# Patient Record
Sex: MALE | Race: WHITE | URBAN - METROPOLITAN AREA
[De-identification: names, ages, dates, MRNs, and addresses within clinical notes are randomized per-mention and may not be internally consistent; named-entity substitution may affect disease eponyms.]

---

## 2023-03-13 ENCOUNTER — TELEMEDICINE (OUTPATIENT)
Dept: PRIMARY CARE | Facility: CLINIC | Age: 33
End: 2023-03-13
Payer: COMMERCIAL

## 2023-03-13 DIAGNOSIS — J01.91 ACUTE RECURRENT SINUSITIS, UNSPECIFIED LOCATION: Primary | ICD-10-CM

## 2023-03-13 PROCEDURE — 99212 OFFICE O/P EST SF 10 MIN: CPT | Performed by: NURSE PRACTITIONER

## 2023-03-13 RX ORDER — AMLODIPINE BESYLATE 5 MG/1
5 TABLET ORAL DAILY
COMMUNITY
Start: 2023-02-06 | End: 2023-11-03 | Stop reason: SDUPTHER

## 2023-03-13 RX ORDER — FLUTICASONE PROPIONATE 50 MCG
1 SPRAY, SUSPENSION (ML) NASAL DAILY
Qty: 16 G | Refills: 0 | Status: SHIPPED | OUTPATIENT
Start: 2023-03-13 | End: 2024-02-08 | Stop reason: WASHOUT

## 2023-03-13 RX ORDER — AMOXICILLIN AND CLAVULANATE POTASSIUM 875; 125 MG/1; MG/1
875 TABLET, FILM COATED ORAL 2 TIMES DAILY
Qty: 20 TABLET | Refills: 0 | Status: SHIPPED | OUTPATIENT
Start: 2023-03-13 | End: 2023-03-23

## 2023-03-13 ASSESSMENT — ENCOUNTER SYMPTOMS
DIARRHEA: 0
CONSTIPATION: 0
ABDOMINAL DISTENTION: 0
APPETITE CHANGE: 0
VOMITING: 0
ACTIVITY CHANGE: 0
SHORTNESS OF BREATH: 0
COUGH: 1
NUMBNESS: 0
PALPITATIONS: 0
DYSURIA: 0
EYE ITCHING: 0
FREQUENCY: 0
SINUS PAIN: 0
RHINORRHEA: 1
WOUND: 0
SINUS PRESSURE: 1
EYE PAIN: 0
NERVOUS/ANXIOUS: 0
WHEEZING: 0
ARTHRALGIAS: 0
HEMATURIA: 0
ABDOMINAL PAIN: 0
SORE THROAT: 0

## 2023-03-13 NOTE — PROGRESS NOTES
Subjective   Patient ID: Adithya Krishnamurthy is a 32 y.o. male who presents for Cough (Symptoms began 3/6. Negative COVID ).    HPI   32 year old male presents with 1 week history of nasal congestion, cough, sinus pressure, and rhinorrhea.   He denies fever, chills, nausea, vomiting, diarrhea, chest pain, or SOB.   He has not been using anything OTC       Review of Systems   Constitutional:  Negative for activity change and appetite change.   HENT:  Positive for postnasal drip, rhinorrhea and sinus pressure. Negative for congestion, ear discharge, ear pain, sinus pain and sore throat.    Eyes:  Negative for pain and itching.   Respiratory:  Positive for cough. Negative for shortness of breath and wheezing.    Cardiovascular:  Negative for chest pain, palpitations and leg swelling.   Gastrointestinal:  Negative for abdominal distention, abdominal pain, constipation, diarrhea and vomiting.   Genitourinary:  Negative for dysuria, frequency, hematuria and urgency.   Musculoskeletal:  Negative for arthralgias and gait problem.   Skin:  Negative for rash and wound.   Neurological:  Negative for numbness.   Psychiatric/Behavioral:  The patient is not nervous/anxious.        Objective   There were no vitals taken for this visit.  BSA There is no height or weight on file to calculate BSA.      Physical Exam  No visits with results within 1 Year(s) from this visit.   Latest known visit with results is:   Legacy Encounter on 12/15/2020   Component Date Value Ref Range Status    SARS-COV-2 RESULT 12/15/2020 DETECTED (A)  Not Detected Final    Comment: .  This assay is designed to detect the N, ORF1ab and/or S genes of SARS-CoV-2   via nucleic acid amplification.  A Negative (NOT DETECTED) result does not   preclude 2019-nCoV infection since the adequacy of sample collection and/or   low viral burden may result in presence of viral nucleic acids below the   clinical sensitivity of this test method.  Negative (NOT DETECTED) result    should not be used as the sole basis for treatment or other patient   management decisions. Rather negative results should be combined with   clinical observations, patient history, and epidemiological information to   make patient management decisions.    Fact sheet for providers: https://www.fda.gov/media/328136/download  Fact sheet for patients: https://www.fda.gov/media/599990/download    This test has received FDA Emergency Use Authorization (EUA) and has been   verified by [facility] ([facility]). This test   is only authorized for the duration of time that circumstances                            exist to   justify the authorization of the emergency use of in vitro diagnostic tests   for the detection of SARS-CoV-2 virus and/or diagnosis of COVID-19 infection   under section 564(b)(1) of the Act, 21 U.S.C. 360bbb-3(b)(1), unless the   authorization is terminated or revoked sooner.      [facility] is certified under CLIA-88 as   qualified to perform high complexity testing. Testing is performed in the   [facility] laboratories located at [address] [place] [address].   16:48 12/15/2020.  COVID CALLED TO KATHRINE, 12/15/2020 16:48       Current Outpatient Medications on File Prior to Visit   Medication Sig Dispense Refill    amLODIPine (Norvasc) 5 mg tablet Take 1 tablet (5 mg) by mouth once daily.       No current facility-administered medications on file prior to visit.     No images are attached to the encounter.            Assessment/Plan   Problem List Items Addressed This Visit          Infectious/Inflammatory     Antibiotic sent to pharmacy  Advised patient to push fluids and start use of cool mist humidifier  Patient to start using flonase nasal spray  Patient to call if develop new or worsening symptoms           Acute recurrent sinusitis - Primary    Relevant Medications    fluticasone (Flonase) 50 mcg/actuation nasal spray     amoxicillin-pot clavulanate (Augmentin) 875-125 mg tablet

## 2023-03-13 NOTE — ASSESSMENT & PLAN NOTE
Antibiotic sent to pharmacy  Advised patient to push fluids and start use of cool mist humidifier  Patient to start using flonase nasal spray  Patient to call if develop new or worsening symptoms

## 2023-03-13 NOTE — PATIENT INSTRUCTIONS
Start Flonase daily for the next 1 week  If no improvement in 3-4 days then may start Augmentin  Call if worsening symptoms develop

## 2023-11-03 DIAGNOSIS — I10 ESSENTIAL HYPERTENSION: Primary | ICD-10-CM

## 2023-11-03 RX ORDER — AMLODIPINE BESYLATE 5 MG/1
5 TABLET ORAL DAILY
Qty: 30 TABLET | Refills: 0 | Status: SHIPPED | OUTPATIENT
Start: 2023-11-03 | End: 2023-12-04 | Stop reason: SDUPTHER

## 2023-12-04 DIAGNOSIS — I10 ESSENTIAL HYPERTENSION: ICD-10-CM

## 2023-12-05 RX ORDER — AMLODIPINE BESYLATE 5 MG/1
5 TABLET ORAL DAILY
Qty: 30 TABLET | Refills: 0 | Status: SHIPPED | OUTPATIENT
Start: 2023-12-05 | End: 2024-01-08

## 2024-01-08 DIAGNOSIS — I10 ESSENTIAL HYPERTENSION: ICD-10-CM

## 2024-01-08 RX ORDER — AMLODIPINE BESYLATE 5 MG/1
5 TABLET ORAL DAILY
Qty: 30 TABLET | Refills: 0 | Status: SHIPPED | OUTPATIENT
Start: 2024-01-08 | End: 2024-02-08 | Stop reason: SDUPTHER

## 2024-01-15 PROBLEM — R07.9 CHEST PAIN: Status: ACTIVE | Noted: 2024-01-15

## 2024-01-15 PROBLEM — F17.200 NICOTINE USE DISORDER: Status: ACTIVE | Noted: 2018-10-10

## 2024-01-15 PROBLEM — I10 ESSENTIAL HYPERTENSION: Chronic | Status: ACTIVE | Noted: 2024-01-15

## 2024-01-15 PROBLEM — F41.8 DEPRESSION WITH ANXIETY: Status: ACTIVE | Noted: 2024-01-15

## 2024-01-15 PROBLEM — F41.9 ANXIETY: Status: ACTIVE | Noted: 2024-01-15

## 2024-01-15 PROBLEM — R94.31 ACUTE ELECTROCARDIOGRAM CHANGES: Status: ACTIVE | Noted: 2024-01-15

## 2024-01-15 PROBLEM — U07.1 COVID-19: Status: ACTIVE | Noted: 2024-01-15

## 2024-01-22 ENCOUNTER — APPOINTMENT (OUTPATIENT)
Dept: CARDIOLOGY | Facility: CLINIC | Age: 34
End: 2024-01-22
Payer: COMMERCIAL

## 2024-02-08 ENCOUNTER — OFFICE VISIT (OUTPATIENT)
Dept: CARDIOLOGY | Facility: CLINIC | Age: 34
End: 2024-02-08
Payer: COMMERCIAL

## 2024-02-08 VITALS
WEIGHT: 205 LBS | HEART RATE: 76 BPM | DIASTOLIC BLOOD PRESSURE: 100 MMHG | OXYGEN SATURATION: 98 % | SYSTOLIC BLOOD PRESSURE: 153 MMHG | BODY MASS INDEX: 29.35 KG/M2 | HEIGHT: 70 IN

## 2024-02-08 DIAGNOSIS — I10 ESSENTIAL HYPERTENSION: Primary | ICD-10-CM

## 2024-02-08 DIAGNOSIS — Z72.0 NICOTINE VAPOR PRODUCT USER: ICD-10-CM

## 2024-02-08 DIAGNOSIS — F10.20 MODERATE ALCOHOL USE DISORDER (MULTI): ICD-10-CM

## 2024-02-08 DIAGNOSIS — E78.5 DYSLIPIDEMIA, GOAL LDL BELOW 100: ICD-10-CM

## 2024-02-08 PROBLEM — R94.31 ACUTE ELECTROCARDIOGRAM CHANGES: Status: RESOLVED | Noted: 2024-01-15 | Resolved: 2024-02-08

## 2024-02-08 PROBLEM — J01.91 ACUTE RECURRENT SINUSITIS: Status: RESOLVED | Noted: 2023-03-13 | Resolved: 2024-02-08

## 2024-02-08 PROCEDURE — 99213 OFFICE O/P EST LOW 20 MIN: CPT | Performed by: STUDENT IN AN ORGANIZED HEALTH CARE EDUCATION/TRAINING PROGRAM

## 2024-02-08 PROCEDURE — 3080F DIAST BP >= 90 MM HG: CPT | Performed by: STUDENT IN AN ORGANIZED HEALTH CARE EDUCATION/TRAINING PROGRAM

## 2024-02-08 PROCEDURE — 99213 OFFICE O/P EST LOW 20 MIN: CPT | Mod: 25 | Performed by: STUDENT IN AN ORGANIZED HEALTH CARE EDUCATION/TRAINING PROGRAM

## 2024-02-08 PROCEDURE — 3077F SYST BP >= 140 MM HG: CPT | Performed by: STUDENT IN AN ORGANIZED HEALTH CARE EDUCATION/TRAINING PROGRAM

## 2024-02-08 RX ORDER — AMLODIPINE BESYLATE 5 MG/1
5 TABLET ORAL DAILY
Qty: 30 TABLET | Refills: 3 | Status: SHIPPED | OUTPATIENT
Start: 2024-02-08 | End: 2024-02-27 | Stop reason: SDUPTHER

## 2024-02-08 ASSESSMENT — ENCOUNTER SYMPTOMS
NEUROLOGICAL NEGATIVE: 1
GASTROINTESTINAL NEGATIVE: 1
PND: 0
DYSPNEA ON EXERTION: 0
RESPIRATORY NEGATIVE: 1
CONSTITUTIONAL NEGATIVE: 1
HEMATOLOGIC/LYMPHATIC NEGATIVE: 1
MUSCULOSKELETAL NEGATIVE: 1
NEAR-SYNCOPE: 0
PALPITATIONS: 0
SYNCOPE: 0
ENDOCRINE NEGATIVE: 1
EYES NEGATIVE: 1
PSYCHIATRIC NEGATIVE: 1
ORTHOPNEA: 0
ALLERGIC/IMMUNOLOGIC NEGATIVE: 1

## 2024-02-08 NOTE — PATIENT INSTRUCTIONS
For your high blood pressure we will continue amlodipine 5 mg daily; if your home blood pressure is running high (greater than 130/90) we would then increase to amlodipine 10 mg daily.     Great job on your weight loss; keep up the good work!    We recommend dialing back your alcohol intake to no more than 2 alcoholic beverages in 1 day (no more than 14 in one week).     We will see you back in heart clinic in 1 year or earlier if needed. Please call my nurse Allison with any questions.     Thank you for your visit today. Please contact our office (via [employer] or phone) with any additional questions.     [facility] [place]    Allison, RN/Clinic Nurse for:    Dr. Monisha Soto    [address], [address]  [place] [address]    Phone: [phone] Press Option 5 then Option 3 to speak with the Clinic Nurse (Allison)    _____    To Reach:    Billing Questions -    [phone]  Scheduling / Rescheduling -  Option 1  Refills / Medication Requests -  Option 3  General Office /  -  Option 4  Results -     Option 6  Medical Records -    Option 7  Repeat Options -    Option 9

## 2024-02-08 NOTE — PROGRESS NOTES
[facility] Cardiology Outpatient Follow-up Visit     Reason for Visit: outpatient follow-up visit; HTN    HPI: Adithya Krishnamurthy is a 33 y.o.  male who presents today for a follow-up visit. Past medical history of hypertension, SARS 2 COVID, anxiety.     Patient was previously evaluated in the ED after a near syncopal episode. EKG showed sinus rhythm with possible possible inferior Q waves in 2 3 and aVF. Etiology of lightheadedness was thought to be secondary to dehydration.     Patient was seen by his primary care physician and referred to cardiology clinic for further evaluation and treatment. Adithya presented to cardiology clinic on 11/10/2022. Prior episode of near syncope occurred when he was working. Patient thinks he may have been dehydrated. However he also had atypical chest pain that was unrelated to exertion. No dyspnea. No further episodes of near syncope or syncope. Recommended exercise stress echo for further evaluation.     Exercise stress echo (12/2022)- low risk for ischemia; no echocardiographic or ECG evidence of ischemia; adequate level of stress achieved    Adithya returned to cardiology clinic on 2/8/2024.  Patient denies any active cardiovascular complaints.  No recent chest pains.  Tolerating physical activity without active cardiac issues.  Per patient his home blood pressure has been running 120-130/80-90 mmHg on amlodipine 5 mg daily.   Patient is a nicotine vape user, not ready to quit.  Patient also drinks heavily with 6-8 beers daily; thinking about cutting back. Per patient he has lost ~ 35 lbs weight diet and lifestyle changes; encouraged patient's progress.       Past Medical History:   - As above    Surgical History:   He has no past surgical history on file.    Family History:   - no family history of early onset coronary disease     Allergies:  Patient has no known allergies.     Social History:   - former tobacco smoker; current nicotine vape user; occasional use; no  "illicit drug use;   - employed- works in [employer]     Prior Cardiovascular Testing (Personally Reviewed):     Exercise stress echo (12/2022)- low risk for ischemia; no echocardiographic or ECG evidence of ischemia; adequate level of stress achieved    Lipid panel (May 2020)- total cholesterol 281, HDL 59, , VLDL 36, triglycerides 182 mg/deciliter.     Exercise stress echo (2019)  1. Exercise stress echo was negative for ischemia at 95% of age maximal heart rate (11.6 METS).  2. There is no left ventricular hypertrophy; left ventricular ejection fraction was estimated at 60%; peak left ventricular ejection fraction at peak exercise was 70%.      Review of Systems:  Review of Systems   Constitutional: Negative.   HENT: Negative.     Eyes: Negative.    Cardiovascular:  Negative for chest pain, dyspnea on exertion, near-syncope, orthopnea, palpitations, paroxysmal nocturnal dyspnea and syncope.   Respiratory: Negative.     Endocrine: Negative.    Hematologic/Lymphatic: Negative.    Skin: Negative.    Musculoskeletal: Negative.    Gastrointestinal: Negative.    Genitourinary: Negative.    Neurological: Negative.    Psychiatric/Behavioral: Negative.     Allergic/Immunologic: Negative.        Outpatient Medications:    Current Outpatient Medications:     amLODIPine (Norvasc) 5 mg tablet, Take 1 tablet (5 mg) by mouth once daily. Needs an apt for additional refills, Disp: 30 tablet, Rfl: 0     Last Recorded Vitals  BP (!) 153/100 (BP Location: Left arm, Patient Position: Sitting, BP Cuff Size: Adult)   Pulse 76   Ht 1.778 m (5' 10\")   Wt 93 kg (205 lb)   SpO2 98%   BMI 29.41 kg/m²     Physical Exam:    Physical Exam  Constitutional:       General: He is not in acute distress.  HENT:      Head: Normocephalic.      Mouth/Throat:      Mouth: Mucous membranes are moist.   Eyes:      Extraocular Movements: Extraocular movements intact.      Conjunctiva/sclera: Conjunctivae normal.   Neck:      Vascular: No JVD. " "  Cardiovascular:      Rate and Rhythm: Normal rate and regular rhythm.      Heart sounds: No murmur heard.  Pulmonary:      Effort: Pulmonary effort is normal. No respiratory distress.      Breath sounds: Normal breath sounds.   Abdominal:      General: There is no distension.   Musculoskeletal:         General: No swelling.   Skin:     General: Skin is warm and dry.   Neurological:      General: No focal deficit present.      Mental Status: He is alert.      Cranial Nerves: No cranial nerve deficit.      Motor: No weakness.   Psychiatric:         Mood and Affect: Mood normal.         Behavior: Behavior normal.         Lab/Radiology/Diagnostic Review:    Labs    Lab Results   Component Value Date    GLUCOSE 92 05/15/2020    CALCIUM 9.8 05/15/2020     05/15/2020    K 4.3 05/15/2020    CO2 26 05/15/2020     05/15/2020    BUN 18 05/15/2020    CREATININE 0.97 05/15/2020       Lab Results   Component Value Date    WBC 5.8 05/15/2020    HGB 16.4 05/15/2020    HCT 48.4 05/15/2020    MCV 89 05/15/2020     05/15/2020       Lab Results   Component Value Date    CHOL 281 (H) 05/15/2020     Lab Results   Component Value Date    HDL 59.0 05/15/2020     No results found for: \"LDLCALC\"  Lab Results   Component Value Date    TRIG 182 (H) 05/15/2020       Lab Results   Component Value Date    TSH 2.23 05/15/2020       Assessment:    33 y.o.  male who presents today for a follow-up visit. Past medical history of hypertension, SARS 2 COVID, anxiety.     Adithya returned to cardiology clinic on 2/8/2024.  Patient denies any active cardiovascular complaints.  Per patient his home blood pressure has been running 120-130/80-90 mmHg on amlodipine 5 mg daily.  Encourage patient to keep home blood pressure log and share at his follow-up visit.  If hypertension remains suboptimally controlled would then increase amlodipine to 10 mg daily.      Patient is a nicotine vape user, not ready to quit.  Patient also drinks " heavily with 6-8 beers daily; thinking about cutting back.  Advised patient to cut back on his alcohol intake to no more than 2 alcoholic drinks daily with goal of no more than 14 alcoholic drinks in 1 week; patient verbalized understanding.    Given history of dyslipidemia we will recheck a fasting lipid panel prior to follow-up visit.    Overall Plan:  1.  Primary hypertension (goal blood pressure less than 130/90 mmHg)  - Continue amlodipine 5 mg daily; patient encouraged to keep home blood pressure log and if not at goal to contact our office.  - If blood pressure not at goal would then increase amlodipine to 10 mg daily; if additional antihypertensive therapy needed would then consider losartan  - Encouraged patient's weight loss along with dietary lifestyle modifications; encourage patient to cut back on his alcohol intake as heavy alcohol use can elevate blood pressure.    2.  Dyslipidemia (goal LDL less than 100 mg/dL)  - Repeat fasting lipid panel  - Continue dietary and lifestyle modifications  - If not at goal would then initiate therapy with atorvastatin 20 mg daily    3.  Moderate alcohol use  - Patient denies any history of alcohol withdrawal  - Patient currently drinking 6-8 beers daily; thinking about cutting back  - I personally counseled the patient for 5 minutes on cutting back on his alcohol intake; discussed goal of no more than 2 alcoholic drinks in a day and no more than 14 in a week    4.  Nicotine vape use  - Not ready to quit; will reassess at follow-up visit    5.  Encouraged regular physical activity; congratulated patient on his weight loss    Disposition-return to cardiology clinic in 1 year or earlier if needed    Thank you for your visit today. Please contact our office with any questions.     Matt Bearden MD

## 2024-02-27 ENCOUNTER — OFFICE VISIT (OUTPATIENT)
Dept: CARDIOLOGY | Facility: CLINIC | Age: 34
End: 2024-02-27
Payer: COMMERCIAL

## 2024-02-27 VITALS
HEIGHT: 70 IN | DIASTOLIC BLOOD PRESSURE: 90 MMHG | BODY MASS INDEX: 29.63 KG/M2 | SYSTOLIC BLOOD PRESSURE: 142 MMHG | OXYGEN SATURATION: 97 % | HEART RATE: 80 BPM | WEIGHT: 207 LBS

## 2024-02-27 DIAGNOSIS — F10.20 MODERATE ALCOHOL USE DISORDER (MULTI): ICD-10-CM

## 2024-02-27 DIAGNOSIS — I10 ESSENTIAL HYPERTENSION: Primary | Chronic | ICD-10-CM

## 2024-02-27 DIAGNOSIS — Z87.891 HISTORY OF NICOTINE VAPING: ICD-10-CM

## 2024-02-27 PROCEDURE — 99213 OFFICE O/P EST LOW 20 MIN: CPT | Performed by: STUDENT IN AN ORGANIZED HEALTH CARE EDUCATION/TRAINING PROGRAM

## 2024-02-27 PROCEDURE — 3080F DIAST BP >= 90 MM HG: CPT | Performed by: STUDENT IN AN ORGANIZED HEALTH CARE EDUCATION/TRAINING PROGRAM

## 2024-02-27 PROCEDURE — 3077F SYST BP >= 140 MM HG: CPT | Performed by: STUDENT IN AN ORGANIZED HEALTH CARE EDUCATION/TRAINING PROGRAM

## 2024-02-27 RX ORDER — AMLODIPINE BESYLATE 10 MG/1
10 TABLET ORAL DAILY
Qty: 90 TABLET | Refills: 3 | Status: SHIPPED | OUTPATIENT
Start: 2024-02-27 | End: 2025-02-26

## 2024-02-27 ASSESSMENT — ENCOUNTER SYMPTOMS
NEUROLOGICAL NEGATIVE: 1
MUSCULOSKELETAL NEGATIVE: 1
PALPITATIONS: 0
NEAR-SYNCOPE: 0
ORTHOPNEA: 0
RESPIRATORY NEGATIVE: 1
DYSPNEA ON EXERTION: 0
SYNCOPE: 0
PND: 0
HEMATOLOGIC/LYMPHATIC NEGATIVE: 1
ENDOCRINE NEGATIVE: 1
EYES NEGATIVE: 1
GASTROINTESTINAL NEGATIVE: 1
PSYCHIATRIC NEGATIVE: 1
ALLERGIC/IMMUNOLOGIC NEGATIVE: 1
CONSTITUTIONAL NEGATIVE: 1

## 2024-02-27 NOTE — PROGRESS NOTES
[facility] Cardiology Outpatient Follow-up Visit     Reason for Visit: outpatient follow-up visit; HTN    HPI: Adithya Krishnamurthy is a 33 y.o.  male who presents today for a follow-up visit. Past medical history of hypertension, SARS 2 COVID, hx nicotine vape use, moderate alcohol use, and anxiety.     Patient was previously evaluated in the ED after a near syncopal episode. EKG showed sinus rhythm with possible possible inferior Q waves in 2 3 and aVF. Etiology of lightheadedness was thought to be secondary to dehydration.     Patient was seen by his primary care physician and referred to cardiology clinic for further evaluation and treatment. Adithya presented to cardiology clinic on 11/10/2022. Prior episode of near syncope occurred when he was working. Patient thinks he may have been dehydrated. However he also had atypical chest pain that was unrelated to exertion. No dyspnea. No further episodes of near syncope or syncope. Recommended exercise stress echo for further evaluation.     Exercise stress echo (12/2022)- low risk for ischemia; no echocardiographic or ECG evidence of ischemia; adequate level of stress achieved    Adithya returned to cardiology clinic on 2/8/2024.  Patient denies any active cardiovascular complaints.  No recent chest pains.  Tolerating physical activity without active cardiac issues.  Per patient his home blood pressure has been running 120-130/80-90 mmHg on amlodipine 5 mg daily.   Patient is a nicotine vape user, not ready to quit.  Patient also drinks heavily with 6-8 beers daily; thinking about cutting back. Per patient he has lost ~ 35 lbs weight diet and lifestyle changes; encouraged patient's progress.     Adithya presented to cardiology clinic on 2/27/2024.  Patient noted that over the past 2 weeks his blood pressure was running 160-170/80-90 mmHg.  Patient stopped nicotine vaping given his high blood pressure.  And increased his amlodipine to 10 mg daily.  Patient notes  interval improvement in his hypertension management after above changes.  Patient presented today for follow-up visit.  He is currently asymptomatic from a cardiac standpoint.  No chest pain, dyspnea, orthopnea, PND, syncope, presyncope, palpitations, or pedal edema.  He remains nicotine free.  He is also cut back to 6 beers daily with plans to decrease his alcohol intake further.    Past Medical History:   - As above    Surgical History:   He has no past surgical history on file.    Family History:   - no family history of early onset coronary disease     Allergies:  Patient has no known allergies.     Social History:   - former tobacco smoker; current nicotine vape user; occasional use; no illicit drug use;   - employed- works in [employer]     Prior Cardiovascular Testing (Personally Reviewed):     Exercise stress echo (12/2022)- low risk for ischemia; no echocardiographic or ECG evidence of ischemia; adequate level of stress achieved    Lipid panel (May 2020)- total cholesterol 281, HDL 59, , VLDL 36, triglycerides 182 mg/deciliter.     Exercise stress echo (2019)  1. Exercise stress echo was negative for ischemia at 95% of age maximal heart rate (11.6 METS).  2. There is no left ventricular hypertrophy; left ventricular ejection fraction was estimated at 60%; peak left ventricular ejection fraction at peak exercise was 70%.      Review of Systems:  Review of Systems   Constitutional: Negative.   HENT: Negative.     Eyes: Negative.    Cardiovascular:  Negative for chest pain, dyspnea on exertion, near-syncope, orthopnea, palpitations, paroxysmal nocturnal dyspnea and syncope.   Respiratory: Negative.     Endocrine: Negative.    Hematologic/Lymphatic: Negative.    Skin: Negative.    Musculoskeletal: Negative.    Gastrointestinal: Negative.    Genitourinary: Negative.    Neurological: Negative.    Psychiatric/Behavioral: Negative.     Allergic/Immunologic: Negative.        Outpatient Medications:    Current  "Outpatient Medications:     amLODIPine (Norvasc) 5 mg tablet, Take 1 tablet (5 mg) by mouth once daily. Needs an apt for additional refills, Disp: 30 tablet, Rfl: 3     Last Recorded Vitals  /90 (BP Location: Left arm, Patient Position: Sitting, BP Cuff Size: Adult)   Pulse 80   Ht 1.778 m (5' 10\")   Wt 93.9 kg (207 lb)   SpO2 97%   BMI 29.70 kg/m²     Physical Exam:    Physical Exam  Constitutional:       General: He is not in acute distress.  HENT:      Head: Normocephalic.      Mouth/Throat:      Mouth: Mucous membranes are moist.   Eyes:      Extraocular Movements: Extraocular movements intact.      Conjunctiva/sclera: Conjunctivae normal.   Neck:      Vascular: No JVD.   Cardiovascular:      Rate and Rhythm: Normal rate and regular rhythm.      Heart sounds: No murmur heard.  Pulmonary:      Effort: Pulmonary effort is normal. No respiratory distress.      Breath sounds: Normal breath sounds.   Abdominal:      General: There is no distension.   Musculoskeletal:         General: No swelling.   Skin:     General: Skin is warm and dry.   Neurological:      General: No focal deficit present.      Mental Status: He is alert.      Cranial Nerves: No cranial nerve deficit.      Motor: No weakness.   Psychiatric:         Mood and Affect: Mood normal.         Behavior: Behavior normal.         Lab/Radiology/Diagnostic Review:    Labs    Lab Results   Component Value Date    GLUCOSE 92 05/15/2020    CALCIUM 9.8 05/15/2020     05/15/2020    K 4.3 05/15/2020    CO2 26 05/15/2020     05/15/2020    BUN 18 05/15/2020    CREATININE 0.97 05/15/2020       Lab Results   Component Value Date    WBC 5.8 05/15/2020    HGB 16.4 05/15/2020    HCT 48.4 05/15/2020    MCV 89 05/15/2020     05/15/2020       Lab Results   Component Value Date    CHOL 281 (H) 05/15/2020     Lab Results   Component Value Date    HDL 59.0 05/15/2020     No results found for: \"LDLCALC\"  Lab Results   Component Value Date    TRIG " 182 (H) 05/15/2020       Lab Results   Component Value Date    TSH 2.23 05/15/2020       Assessment:   33 y.o.  male who presents today for a follow-up visit. Past medical history of hypertension, SARS 2 COVID, hx nicotine vape use, moderate alcohol use, and anxiety.     Adithya presented to cardiology clinic on 2/27/2024.  Patient noted that over the past 2 weeks his blood pressure was running 160-170/80-90 mmHg.  Patient stopped nicotine vaping given his high blood pressure.  And increased his amlodipine to 10 mg daily.  Patient notes interval improvement in his hypertension management after above changes.  Patient presented today for follow-up visit.  He is currently asymptomatic from a cardiac standpoint.  No chest pain, dyspnea, orthopnea, PND, syncope, presyncope, palpitations, or pedal edema.  He remains nicotine free.  He is also cut back to 6 beers daily with plans to decrease his alcohol intake further.    Overall Plan:  1.  Primary hypertension (goal blood pressure less than 130/90 mmHg)  - Increase amlodipine 10 mg daily; patient encouraged to keep home blood pressure log and if not at goal to contact our office.  - If blood pressure not at goal would then add losartan 25 mg daily and up-titrate as tolerated  - Encouraged patient's weight loss along with dietary lifestyle modifications; encourage patient to cut back on his alcohol intake as heavy alcohol use can elevate blood pressure.    2.  Dyslipidemia (goal LDL less than 100 mg/dL)  - Repeat fasting lipid panel prior to follow-up visit in 6 months  - Continue dietary and lifestyle modifications  - If not at goal would then initiate therapy with atorvastatin 20 mg daily    3.  Moderate alcohol use  - Patient denies any history of alcohol withdrawal  - Patient currently drinking 6 beers daily; thinking about cutting back  - I personally counseled the patient for 3 minutes on cutting back on his alcohol intake; discussed goal of no more than 2  alcoholic drinks in a day and no more than 14 in a week    4.  Former Nicotine vape use  - Quit in 2/2024; motivated to remain nicotine free; congratulated patient on his progress    5.  Encouraged regular physical activity; congratulated patient on his weight loss    Disposition-return to cardiology clinic in 6 months or earlier if needed    Thank you for your visit today. Please contact our office with any questions.     Matt Bearden MD

## 2024-02-27 NOTE — PATIENT INSTRUCTIONS
For your high blood pressure; you have had interval improvement after quitting vaping.     We will increase your amlodipine to 10 mg daily. If your blood pressure was not at goal (< 140/90 mmHg) we would then consider adding losartan 25 mg daily.     If you have any questions, please call my nurse Allison.       Thank you for your visit today. Please contact our office (via [employer] or phone) with any additional questions.     [facility] [place]    LENORE Cooper/Clinic Nurse for:    Dr. Monisha Soto    [address], [address]  [place] [address]    Phone: [phone] Press Option 5 then Option 3 to speak with the Clinic Nurse (Allison)    _____    To Reach:    Billing Questions -    [phone]  Scheduling / Rescheduling -  Option 1  Refills / Medication Requests -  Option 3  General Office / [place] -  Option 4  Results -     Option 6  Medical Records -    Option 7  Repeat Options -    Option 9

## 2024-03-11 ENCOUNTER — TELEPHONE (OUTPATIENT)
Dept: CARDIOLOGY | Facility: CLINIC | Age: 34
End: 2024-03-11
Payer: COMMERCIAL

## 2024-03-11 DIAGNOSIS — I10 ESSENTIAL HYPERTENSION: ICD-10-CM

## 2024-03-11 RX ORDER — LOSARTAN POTASSIUM 25 MG/1
25 TABLET ORAL DAILY
Qty: 30 TABLET | Refills: 11 | Status: SHIPPED | OUTPATIENT
Start: 2024-03-11 | End: 2024-03-28 | Stop reason: SDUPTHER

## 2024-03-11 NOTE — TELEPHONE ENCOUNTER
"Per recent OV on 2/27: \"1.  Primary hypertension (goal blood pressure less than 130/90 mmHg)  - Increase amlodipine 10 mg daily; patient encouraged to keep home blood pressure log and if not at goal to contact our office.  - If blood pressure not at goal would then add losartan 25 mg daily and up-titrate as tolerated\"  Losartan 25mg erx sent to Provider. [employer] message sent to patient re: new med.    "

## 2024-03-11 NOTE — TELEPHONE ENCOUNTER
----- Message from Adithya Krishnamurthy sent at 3/11/2024 12:16 PM EDT -----  Regarding: Blood Pressure  Contact: [phone]  Hello-    Yes, I’m still taking the 10 and I take it after taking my meds.

## 2024-03-28 ENCOUNTER — TELEPHONE (OUTPATIENT)
Dept: CARDIOLOGY | Facility: CLINIC | Age: 34
End: 2024-03-28
Payer: COMMERCIAL

## 2024-03-28 DIAGNOSIS — I10 ESSENTIAL HYPERTENSION: ICD-10-CM

## 2024-03-28 RX ORDER — LOSARTAN POTASSIUM 25 MG/1
50 TABLET ORAL DAILY
Qty: 180 TABLET | Refills: 3 | Status: SHIPPED | OUTPATIENT
Start: 2024-03-28 | End: 2025-03-28

## 2024-03-28 NOTE — TELEPHONE ENCOUNTER
Spoke with Dr. Bearden and VO obtained to increase Losartan to 50mg daily.  Spoke with patient and instructions provided. Patient verb understanding. Will call in 2 weeks if bp remains elevated.

## 2024-03-28 NOTE — TELEPHONE ENCOUNTER
----- Message from Adithya Krishnamurthy sent at 3/27/2024  4:48 PM EDT -----  Regarding: Blood pressure  Contact: [phone]  Hello-    My BP is still consistently about 160/105 to 110, even with the new medication. This has me concerned. Boubacar if the new medication needs adjusted or if theres more testing that needs to be done to see why it’s not going down. Please let me know what the next step is.     ThanksSreekanth

## 2024-06-21 ENCOUNTER — TELEPHONE (OUTPATIENT)
Dept: PRIMARY CARE | Facility: CLINIC | Age: 34
End: 2024-06-21
Payer: COMMERCIAL

## 2024-06-21 DIAGNOSIS — L23.7 POISON IVY: ICD-10-CM

## 2024-06-21 RX ORDER — PREDNISONE 5 MG/1
TABLET ORAL DAILY
Qty: 55 TABLET | Refills: 0 | Status: SHIPPED | OUTPATIENT
Start: 2024-06-21 | End: 2024-07-01

## 2024-06-21 RX ORDER — MOMETASONE FUROATE 1 MG/G
CREAM TOPICAL
Qty: 45 G | Refills: 0 | Status: SHIPPED | OUTPATIENT
Start: 2024-06-21

## 2024-06-21 NOTE — TELEPHONE ENCOUNTER
Pt wife called the office stating her  put the wrong answer down regarding the medication for his poison Ivy. I was suppose to say he needs pills not cream. If you can change the prescription to the pill please advise    Pt number: [phone]

## 2024-06-24 ENCOUNTER — PATIENT MESSAGE (OUTPATIENT)
Dept: CARDIOLOGY | Facility: CLINIC | Age: 34
End: 2024-06-24
Payer: COMMERCIAL

## 2024-06-26 DIAGNOSIS — I10 ESSENTIAL HYPERTENSION: Chronic | ICD-10-CM

## 2024-06-26 DIAGNOSIS — I10 ESSENTIAL HYPERTENSION: ICD-10-CM

## 2024-06-26 RX ORDER — AMLODIPINE BESYLATE 10 MG/1
10 TABLET ORAL DAILY
Qty: 90 TABLET | Refills: 3 | Status: SHIPPED | OUTPATIENT
Start: 2024-06-26 | End: 2025-06-26

## 2024-06-26 RX ORDER — LOSARTAN POTASSIUM 100 MG/1
100 TABLET ORAL DAILY
Qty: 90 TABLET | Refills: 3 | Status: SHIPPED | OUTPATIENT
Start: 2024-06-26 | End: 2025-06-26

## 2024-11-26 ENCOUNTER — PATIENT MESSAGE (OUTPATIENT)
Dept: CARDIOLOGY | Facility: CLINIC | Age: 34
End: 2024-11-26
Payer: COMMERCIAL

## 2024-11-26 DIAGNOSIS — E78.5 DYSLIPIDEMIA, GOAL LDL BELOW 100: ICD-10-CM

## 2024-11-26 DIAGNOSIS — I10 ESSENTIAL HYPERTENSION: ICD-10-CM

## 2024-12-30 ENCOUNTER — OFFICE VISIT (OUTPATIENT)
Dept: CARDIOLOGY | Facility: CLINIC | Age: 34
End: 2024-12-30
Payer: COMMERCIAL

## 2024-12-30 VITALS
BODY MASS INDEX: 31.28 KG/M2 | SYSTOLIC BLOOD PRESSURE: 144 MMHG | OXYGEN SATURATION: 99 % | DIASTOLIC BLOOD PRESSURE: 80 MMHG | HEART RATE: 85 BPM | WEIGHT: 218 LBS | RESPIRATION RATE: 16 BRPM

## 2024-12-30 DIAGNOSIS — I10 ESSENTIAL HYPERTENSION: Primary | ICD-10-CM

## 2024-12-30 DIAGNOSIS — E78.5 HYPERLIPIDEMIA LDL GOAL <100: ICD-10-CM

## 2024-12-30 PROCEDURE — 99213 OFFICE O/P EST LOW 20 MIN: CPT | Performed by: STUDENT IN AN ORGANIZED HEALTH CARE EDUCATION/TRAINING PROGRAM

## 2024-12-30 PROCEDURE — 3079F DIAST BP 80-89 MM HG: CPT | Performed by: STUDENT IN AN ORGANIZED HEALTH CARE EDUCATION/TRAINING PROGRAM

## 2024-12-30 PROCEDURE — 3077F SYST BP >= 140 MM HG: CPT | Performed by: STUDENT IN AN ORGANIZED HEALTH CARE EDUCATION/TRAINING PROGRAM

## 2024-12-30 RX ORDER — LOSARTAN POTASSIUM 50 MG/1
50 TABLET ORAL DAILY
Qty: 90 TABLET | Refills: 3 | Status: SHIPPED | OUTPATIENT
Start: 2024-12-30 | End: 2025-12-30

## 2024-12-30 RX ORDER — LOSARTAN POTASSIUM 100 MG/1
50 TABLET ORAL DAILY
Start: 2024-12-30 | End: 2024-12-30 | Stop reason: SDUPTHER

## 2024-12-30 ASSESSMENT — ENCOUNTER SYMPTOMS
MUSCULOSKELETAL NEGATIVE: 1
CONSTITUTIONAL NEGATIVE: 1
GASTROINTESTINAL NEGATIVE: 1
ORTHOPNEA: 0
ALLERGIC/IMMUNOLOGIC NEGATIVE: 1
PSYCHIATRIC NEGATIVE: 1
ENDOCRINE NEGATIVE: 1
NEUROLOGICAL NEGATIVE: 1
PND: 0
EYES NEGATIVE: 1
NEAR-SYNCOPE: 0
DYSPNEA ON EXERTION: 0
PALPITATIONS: 0
RESPIRATORY NEGATIVE: 1
SYNCOPE: 0
HEMATOLOGIC/LYMPHATIC NEGATIVE: 1

## 2024-12-30 NOTE — PROGRESS NOTES
[facility] Cardiology Outpatient Follow-up Visit     Reason for Visit: outpatient follow-up visit; HTN    HPI: Adithya Krishnamurthy is a 34 y.o.  male who presents today for a follow-up visit. Past medical history of hypertension, SARS 2 COVID, hx nicotine vape use, moderate alcohol use, and anxiety.     Patient was previously evaluated in the ED after a near syncopal episode. EKG showed sinus rhythm with possible possible inferior Q waves in 2 3 and aVF. Etiology of lightheadedness was thought to be secondary to dehydration.     Patient was seen by his primary care physician and referred to cardiology clinic for further evaluation and treatment. Adithya presented to cardiology clinic on 11/10/2022. Prior episode of near syncope occurred when he was working. Patient thinks he may have been dehydrated. However he also had atypical chest pain that was unrelated to exertion. No dyspnea. No further episodes of near syncope or syncope. Recommended exercise stress echo for further evaluation.     Exercise stress echo (12/2022)- low risk for ischemia; no echocardiographic or ECG evidence of ischemia; adequate level of stress achieved    Adithya returned to cardiology clinic on 2/8/2024.  Patient denies any active cardiovascular complaints.  No recent chest pains.  Tolerating physical activity without active cardiac issues.  Per patient his home blood pressure has been running 120-130/80-90 mmHg on amlodipine 5 mg daily.   Patient is a nicotine vape user, not ready to quit.  Patient also drinks heavily with 6-8 beers daily; thinking about cutting back. Per patient he has lost ~ 35 lbs weight diet and lifestyle changes; encouraged patient's progress.     Adithya presented to cardiology clinic on 2/27/2024.  Patient noted that over the past 2 weeks his blood pressure was running 160-170/80-90 mmHg.  Patient stopped nicotine vaping given his high blood pressure.  And increased his amlodipine to 10 mg daily.  Patient notes  interval improvement in his hypertension management after above changes.  Patient presented today for follow-up visit.  He is currently asymptomatic from a cardiac standpoint.  No chest pain, dyspnea, orthopnea, PND, syncope, presyncope, palpitations, or pedal edema.  He remains nicotine free.  He is also cut back to 6 beers daily with plans to decrease his alcohol intake further.    Adithya presented to cardiology clinic on 12/30/2024.  No active cardiovascular complaints at this time.  Patient has rare left shoulder pain is unrelated to activity; no clear provoking or alleviating factors.  Interval improvement in hypertension control after adding losartan and uptitration to 50 mg daily, patient remains on amlodipine 10 mg daily.  Patient has decreased alcohol intake to 4 beers daily.  Repeat fasting lipid panel pending, patient has follow-up with his PCP in February 2025.  If LDL is not at goal plan would then be to initiate atorvastatin 20 mg daily.     Past Medical History:   - As above    Surgical History:   He has no past surgical history on file.    Family History:   - no family history of early onset coronary disease     Allergies:  Patient has no known allergies.     Social History:   - former tobacco smoker; hx nicotine vape use; no illicit drug use; drinks 4 alcoholic drinks daily (interval decrease 12/30/2024)   - employed- works in [employer]     Prior Cardiovascular Testing (Personally Reviewed):     Exercise stress echo (12/2022)- low risk for ischemia; no echocardiographic or ECG evidence of ischemia; adequate level of stress achieved    Lipid panel (May 2020)- total cholesterol 281, HDL 59, , VLDL 36, triglycerides 182 mg/deciliter.     Exercise stress echo (2019)  1. Exercise stress echo was negative for ischemia at 95% of age maximal heart rate (11.6 METS).  2. There is no left ventricular hypertrophy; left ventricular ejection fraction was estimated at 60%; peak left ventricular ejection fraction  at peak exercise was 70%.      Review of Systems:  Review of Systems   Constitutional: Negative.   HENT: Negative.     Eyes: Negative.    Cardiovascular:  Negative for chest pain, dyspnea on exertion, near-syncope, orthopnea, palpitations, paroxysmal nocturnal dyspnea and syncope.   Respiratory: Negative.     Endocrine: Negative.    Hematologic/Lymphatic: Negative.    Skin: Negative.    Musculoskeletal: Negative.    Gastrointestinal: Negative.    Genitourinary: Negative.    Neurological: Negative.    Psychiatric/Behavioral: Negative.     Allergic/Immunologic: Negative.        Outpatient Medications:    Current Outpatient Medications:     amLODIPine (Norvasc) 10 mg tablet, Take 1 tablet (10 mg) by mouth once daily., Disp: 90 tablet, Rfl: 3    losartan (Cozaar) 100 mg tablet, Take 1 tablet (100 mg) by mouth once daily., Disp: 90 tablet, Rfl: 3    mometasone (Elocon) 0.1 % cream, Apply sparingly once daily to lesions, Disp: 45 g, Rfl: 0     Last Recorded Vitals  There were no vitals taken for this visit.    Physical Exam:    Physical Exam  Constitutional:       General: He is not in acute distress.  HENT:      Head: Normocephalic.      Mouth/Throat:      Mouth: Mucous membranes are moist.   Eyes:      Extraocular Movements: Extraocular movements intact.      Conjunctiva/sclera: Conjunctivae normal.   Neck:      Vascular: No JVD.   Cardiovascular:      Rate and Rhythm: Normal rate and regular rhythm.      Heart sounds: No murmur heard.  Pulmonary:      Effort: Pulmonary effort is normal. No respiratory distress.      Breath sounds: Normal breath sounds.   Abdominal:      General: There is no distension.   Musculoskeletal:         General: No swelling.   Skin:     General: Skin is warm and dry.   Neurological:      General: No focal deficit present.      Mental Status: He is alert.      Cranial Nerves: No cranial nerve deficit.      Motor: No weakness.   Psychiatric:         Mood and Affect: Mood normal.          "Behavior: Behavior normal.         Lab/Radiology/Diagnostic Review:    Labs    Lab Results   Component Value Date    GLUCOSE 92 05/15/2020    CALCIUM 9.8 05/15/2020     05/15/2020    K 4.3 05/15/2020    CO2 26 05/15/2020     05/15/2020    BUN 18 05/15/2020    CREATININE 0.97 05/15/2020       Lab Results   Component Value Date    WBC 5.8 05/15/2020    HGB 16.4 05/15/2020    HCT 48.4 05/15/2020    MCV 89 05/15/2020     05/15/2020       Lab Results   Component Value Date    CHOL 281 (H) 05/15/2020     Lab Results   Component Value Date    HDL 59.0 05/15/2020     No results found for: \"LDLCALC\"  Lab Results   Component Value Date    TRIG 182 (H) 05/15/2020       Lab Results   Component Value Date    TSH 2.23 05/15/2020       Assessment:   34 y.o.  male who presents today for a follow-up visit. Past medical history of hypertension, SARS 2 COVID, hx nicotine vape use, moderate alcohol use, and anxiety.     Adithya presented to cardiology clinic on 12/30/2024.  No active cardiovascular complaints at this time.  Patient has rare left shoulder pain is unrelated to activity; no clear provoking or alleviating factors.  Interval improvement in hypertension control after adding losartan and uptitration to 50 mg daily, patient remains on amlodipine 10 mg daily.  Patient has decreased alcohol intake to 4 beers daily.  Repeat fasting lipid panel pending, patient has follow-up with his PCP in February 2025.  If LDL is not at goal plan would then be to initiate atorvastatin 20 mg daily.     Overall Plan:  1.  Primary hypertension (goal blood pressure less than 130/90 mmHg)  - Continue amlodipine 10 mg daily; patient encouraged to keep home blood pressure log and if not at goal to contact our office.  - Continue losartan 50 mg daily  - If sub-optimal control would then increase losartan to 100 mg daily   - Encouraged patient's weight loss along with dietary lifestyle modifications    2.  Dyslipidemia (goal " "LDL less than 100 mg/dL)  - Repeat fasting lipid panel pending  - Continue dietary and lifestyle modifications  - If not at goal would then initiate therapy with atorvastatin 20 mg daily    3.  Moderate alcohol use  - Patient denies any history of alcohol withdrawal  - Patient currently drinking 4 beers daily; thinking about cutting back  - Goal of no more than 2 alcoholic drinks in a day and no more than 14 in a week    4.  Former Nicotine vape use  - Quit in 2/2024; motivated to remain nicotine free; congratulated patient on his progress    5.  Discussed \"red flag\" symptoms that should prompt immediate medical attention; patient verbalized understanding    Disposition-return to cardiology clinic in 12 months or earlier if needed    Thank you for your visit today. Please contact our office with any questions.     Matt Bearden MD        "

## 2024-12-30 NOTE — PATIENT INSTRUCTIONS
Thank you for your visit today. Please contact our office (via MyChart or phone) with any additional questions.     [facility]    Allison, RN/Clinic Nurse for:    Dr. Monisha Soto    [address], [address]  [place] [address]    Phone: [phone] Press Option 5 then Option 3 to speak with the Clinic Nurse (Allison)    _____    To Reach:    Billing Questions -    [phone]  Scheduling / Rescheduling -  Option 1  Refills / Medication Requests -  Option 3  General Office / [facility] -  Option 4  Results -     Option 6  Medical Records -    Option 7  Repeat Options -    Option 9

## 2025-01-15 DIAGNOSIS — Z00.00 HEALTHCARE MAINTENANCE: ICD-10-CM

## 2025-02-07 ENCOUNTER — APPOINTMENT (OUTPATIENT)
Dept: PRIMARY CARE | Facility: CLINIC | Age: 35
End: 2025-02-07
Payer: COMMERCIAL

## 2025-02-08 LAB
A ALTERNATA IGE QN: <0.1 KU/L
A ALTERNATA IGE RAST: 0
A FUMIGATUS IGE QN: <0.1 KU/L
A FUMIGATUS IGE RAST: 0
ALBUMIN SERPL-MCNC: 4.7 G/DL (ref 3.6–5.1)
ALP SERPL-CCNC: 55 U/L (ref 36–130)
ALT SERPL-CCNC: 29 U/L (ref 9–46)
ANION GAP SERPL CALCULATED.4IONS-SCNC: 14 MMOL/L (CALC) (ref 7–17)
AST SERPL-CCNC: 25 U/L (ref 10–40)
BASOPHILS # BLD AUTO: 72 CELLS/UL (ref 0–200)
BASOPHILS NFR BLD AUTO: 1.2 %
BERMUDA GRASS IGE QN: 0.25 KU/L
BERMUDA GRASS IGE RAST: ABNORMAL
BILIRUB SERPL-MCNC: 0.7 MG/DL (ref 0.2–1.2)
BOXELDER IGE QN: 0.54 KU/L
BOXELDER IGE RAST: 1
BUN SERPL-MCNC: 13 MG/DL (ref 7–25)
C HERBARUM IGE QN: <0.1 KU/L
C HERBARUM IGE RAST: 0
CALCIUM SERPL-MCNC: 9.7 MG/DL (ref 8.6–10.3)
CALIF WALNUT POLN IGE QN: 0.2 KU/L
CALIF WALNUT POLN IGE RAST: ABNORMAL
CAT DANDER IGE QN: <0.1 KU/L
CAT DANDER IGE RAST: 0
CHLORIDE SERPL-SCNC: 104 MMOL/L (ref 98–110)
CHOLEST SERPL-MCNC: 277 MG/DL
CHOLEST/HDLC SERPL: 3.9 (CALC)
CMN PIGWEED IGE QN: 0.16 KU/L
CMN PIGWEED IGE RAST: ABNORMAL
CO2 SERPL-SCNC: 21 MMOL/L (ref 20–32)
COMMON RAGWEED IGE QN: 0.18 KU/L
COMMON RAGWEED IGE RAST: ABNORMAL
COTTONWOOD IGE QN: 0.2 KU/L
COTTONWOOD IGE RAST: ABNORMAL
CREAT SERPL-MCNC: 0.81 MG/DL (ref 0.6–1.26)
D FARINAE IGE QN: <0.1 KU/L
D FARINAE IGE RAST: 0
D PTERONYSS IGE QN: <0.1 KU/L
D PTERONYSS IGE RAST: 0
DOG DANDER IGE QN: <0.1 KU/L
DOG DANDER IGE RAST: 0
EGFRCR SERPLBLD CKD-EPI 2021: 119 ML/MIN/1.73M2
EOSINOPHIL # BLD AUTO: 180 CELLS/UL (ref 15–500)
EOSINOPHIL NFR BLD AUTO: 3 %
ERYTHROCYTE [DISTWIDTH] IN BLOOD BY AUTOMATED COUNT: 12.5 % (ref 11–15)
FERRITIN SERPL-MCNC: 189 NG/ML (ref 38–380)
GLUCOSE SERPL-MCNC: 88 MG/DL (ref 65–99)
HCT VFR BLD AUTO: 49.2 % (ref 38.5–50)
HDLC SERPL-MCNC: 71 MG/DL
HGB BLD-MCNC: 16.8 G/DL (ref 13.2–17.1)
IGE SERPL-ACNC: 76 KU/L
IRON SATN MFR SERPL: 35 % (CALC) (ref 20–48)
IRON SERPL-MCNC: 120 MCG/DL (ref 50–180)
LDLC SERPL CALC-MCNC: 182 MG/DL (CALC)
LONDON PLANE IGE QN: 0.21 KU/L
LONDON PLANE IGE RAST: ABNORMAL
LYMPHOCYTES # BLD AUTO: 2082 CELLS/UL (ref 850–3900)
LYMPHOCYTES NFR BLD AUTO: 34.7 %
MCH RBC QN AUTO: 30 PG (ref 27–33)
MCHC RBC AUTO-ENTMCNC: 34.1 G/DL (ref 32–36)
MCV RBC AUTO: 87.9 FL (ref 80–100)
MONOCYTES # BLD AUTO: 438 CELLS/UL (ref 200–950)
MONOCYTES NFR BLD AUTO: 7.3 %
MOUSE URINE PROT IGE QN: <0.1 KU/L
MOUSE URINE PROT IGE RAST: 0
MT JUNIPER IGE QN: 0.18 KU/L
MT JUNIPER IGE RAST: ABNORMAL
NEUTROPHILS # BLD AUTO: 3228 CELLS/UL (ref 1500–7800)
NEUTROPHILS NFR BLD AUTO: 53.8 %
NONHDLC SERPL-MCNC: 206 MG/DL (CALC)
P NOTATUM IGE QN: <0.1 KU/L
P NOTATUM IGE RAST: 0
PECAN/HICK TREE IGE QN: 0.25 KU/L
PECAN/HICK TREE IGE RAST: ABNORMAL
PLATELET # BLD AUTO: 287 THOUSAND/UL (ref 140–400)
PMV BLD REES-ECKER: 9.7 FL (ref 7.5–12.5)
POTASSIUM SERPL-SCNC: 4.4 MMOL/L (ref 3.5–5.3)
PROT SERPL-MCNC: 7.6 G/DL (ref 6.1–8.1)
RBC # BLD AUTO: 5.6 MILLION/UL (ref 4.2–5.8)
REF LAB TEST REF RANGE: NORMAL
ROACH IGE QN: 0.15 KU/L
ROACH IGE RAST: ABNORMAL
SALTWORT IGE QN: 0.2 KU/L
SALTWORT IGE RAST: ABNORMAL
SHEEP SORREL IGE QN: 0.19 KU/L
SHEEP SORREL IGE RAST: ABNORMAL
SILVER BIRCH IGE QN: 0.14 KU/L
SILVER BIRCH IGE RAST: ABNORMAL
SODIUM SERPL-SCNC: 139 MMOL/L (ref 135–146)
TIBC SERPL-MCNC: 342 MCG/DL (CALC) (ref 250–425)
TIMOTHY IGE QN: 0.26 KU/L
TIMOTHY IGE RAST: ABNORMAL
TRIGL SERPL-MCNC: 109 MG/DL
TSH SERPL-ACNC: 1.6 MIU/L (ref 0.4–4.5)
WBC # BLD AUTO: 6 THOUSAND/UL (ref 3.8–10.8)
WHITE ASH IGE QN: 0.97 KU/L
WHITE ASH IGE RAST: 2
WHITE ELM IGE QN: 0.21 KU/L
WHITE ELM IGE RAST: ABNORMAL
WHITE MULBERRY IGE QN: <0.1 KU/L
WHITE MULBERRY IGE RAST: 0
WHITE OAK IGE QN: 0.19 KU/L
WHITE OAK IGE RAST: ABNORMAL

## 2025-02-11 ENCOUNTER — APPOINTMENT (OUTPATIENT)
Dept: CARDIOLOGY | Facility: CLINIC | Age: 35
End: 2025-02-11
Payer: COMMERCIAL

## 2025-03-07 ENCOUNTER — APPOINTMENT (OUTPATIENT)
Dept: PRIMARY CARE | Facility: CLINIC | Age: 35
End: 2025-03-07
Payer: COMMERCIAL

## 2025-05-27 DIAGNOSIS — L23.7 POISON IVY: ICD-10-CM

## 2025-05-27 RX ORDER — PREDNISONE 5 MG/1
TABLET ORAL
Qty: 55 TABLET | Refills: 0 | Status: SHIPPED | OUTPATIENT
Start: 2025-05-27 | End: 2025-06-06

## 2025-06-10 ENCOUNTER — HOSPITAL ENCOUNTER (OUTPATIENT)
Dept: RADIOLOGY | Facility: CLINIC | Age: 35
Discharge: HOME | End: 2025-06-10
Payer: COMMERCIAL

## 2025-06-10 DIAGNOSIS — I10 ESSENTIAL HYPERTENSION: Chronic | ICD-10-CM

## 2025-06-10 DIAGNOSIS — E78.5 DYSLIPIDEMIA, GOAL LDL BELOW 100: ICD-10-CM

## 2025-06-10 DIAGNOSIS — I10 ESSENTIAL HYPERTENSION: ICD-10-CM

## 2025-06-10 PROCEDURE — 75571 CT HRT W/O DYE W/CA TEST: CPT

## 2025-06-11 RX ORDER — AMLODIPINE BESYLATE 10 MG/1
10 TABLET ORAL DAILY
Qty: 90 TABLET | Refills: 1 | Status: SHIPPED | OUTPATIENT
Start: 2025-06-11 | End: 2026-06-11

## 2025-12-15 ENCOUNTER — APPOINTMENT (OUTPATIENT)
Dept: CARDIOLOGY | Facility: CLINIC | Age: 35
End: 2025-12-15
Payer: COMMERCIAL

## 2025-12-16 ENCOUNTER — APPOINTMENT (OUTPATIENT)
Dept: CARDIOLOGY | Facility: CLINIC | Age: 35
End: 2025-12-16
Payer: COMMERCIAL

## 2025-12-17 ENCOUNTER — APPOINTMENT (OUTPATIENT)
Dept: CARDIOLOGY | Facility: CLINIC | Age: 35
End: 2025-12-17
Payer: COMMERCIAL